# Patient Record
(demographics unavailable — no encounter records)

---

## 2025-02-12 NOTE — ASSESSMENT
[FreeTextEntry1] : # Type 2 diabetes mellitus / Obesity class 1 - A1C is 6.5% today. Denies hypoglycemic episodes but reports tightly controlled fasting glucose levels. Additionally, now with vaginitis, unclear if related to SGLT-2 inhibitor use. Received antifungal treatment without much improvement. Following with gynecologist.  - STOP Jardiance 25 mg daily due to vaginitis. Discussed rationale for discontinuing the medication, given the potential for increased urinary glucose and predisposition to genitourinary infections. - CONTINUE Metformin 1,000 mg twice daily. - DECREASE Glipizide to 5 mg IR twice daily due to reported tightly controlled glucose while fasting. - START Mounjaro at 2.5 mg weekly for the first four doses, then INCREASE to 5 mg weekly, if no side effects, to maintain glycemic control and aid with weight loss. We discussed the risks, benefits, and contraindications of starting this medication, including nausea, vomiting, diarrhea, constipation, pancreatitis, and gallstones. She denied having a personal history of pancreatitis or a family history of medullary thyroid cancer. - Goal is to eventually discontinue glipizide if able to up-titrate Mounjaro to an effective maintenance dose. - Reviewed importance of balanced diet and portion control to optimize efficacy of GLP-1 agonist therapy. - Prescription sent to preferred pharmacy. Advised to notify if any issues with obtaining medication or tolerance. - Microvascular complications:     > Neuropathy: Monofilament was within normal limits today (7/2024).     > Nephropathy: She had an albumin/creatinine ratio at an outside lab. She mentioned that she would have the results faxed to me later today.      > Retinopathy: Last dilated eye exam a year ago. She was following with an ophthalmologist for known retinopathy. Encouraged her to schedule another appointment. She said that she would schedule the appointment.    # Hyperlipidemia - Continue with atorvastatin 20 mg nightly.  - Diet and lifestyle modification advised.  RTC in 3 months

## 2025-02-12 NOTE — PHYSICAL EXAM
[Alert] : alert [Well Nourished] : well nourished [Obese] : obese [No Acute Distress] : no acute distress [Well Developed] : well developed [Normal Sclera/Conjunctiva] : normal sclera/conjunctiva [EOMI] : extra ocular movement intact [No Proptosis] : no proptosis [Thyroid Not Enlarged] : the thyroid was not enlarged [No Thyroid Nodules] : no palpable thyroid nodules [No Respiratory Distress] : no respiratory distress [No Accessory Muscle Use] : no accessory muscle use [Clear to Auscultation] : lungs were clear to auscultation bilaterally [Normal S1, S2] : normal S1 and S2 [Normal Rate] : heart rate was normal [Regular Rhythm] : with a regular rhythm [No Edema] : no peripheral edema [Pedal Pulses Normal] : the pedal pulses are present [Normal Bowel Sounds] : normal bowel sounds [Not Tender] : non-tender [Not Distended] : not distended [Soft] : abdomen soft [Normal Gait] : normal gait [Normal Strength/Tone] : muscle strength and tone were normal [No Rash] : no rash [Normal Reflexes] : deep tendon reflexes were 2+ and symmetric [No Tremors] : no tremors [Oriented x3] : oriented to person, place, and time

## 2025-02-12 NOTE — HISTORY OF PRESENT ILLNESS
[FreeTextEntry1] : Ms. De Anda is a 60-year-old woman with type 2 diabetes mellitus, obesity class 3, hypertension, hyperlipidemia, endometriosis, endometrial polyps, and fibroids who presents for follow up.   DIABETES HISTORY - Age at diagnosis: 35 years old. - Symptoms at the time of diagnosis: Found on routine labs.  - Current Therapy: Metformin 1,000 mg twice daily, Jardiance 25 mg daily, and Glipizide 10 mg IR twice daily.  - History of other regimens: Denies. - History of hypoglycemia: Denies. - History of DKA/HHS: Denies. - Complications: Denies neuropathy symptoms. She has known retinopathy and reports having multiple injections and laser therapy in the past. Following with ophthalmologist, has scheduled appointment later this month.   2/12/25 - Ms. De Anda presents for a follow-up visit, reporting a recent emergency room visit due to urinary discomfort and a burning sensation in her bladder. The workup revealed her urinalysis was within normal limits (not available for review), except for glucosuria, which is expected given her current use of an SGLT-2 inhibitor. However, a vaginal exam showed hyperemia. She was initially prescribed medication in the emergency room for suspected vaginitis and subsequently followed up with her gynecologist, who prescribed another course of treatment. She reports only minimal improvement in her symptoms.  Additionally, she is experiencing issues with stress urinary incontinence and has been recently referred to a urologist for further evaluation. Ms. De Anda notes that her fasting glucose levels have been on the lower side, sometimes reaching 68 to the low 70s mg/dL, but she does not check fingersticks at other times of the day. She denies experiencing any hypoglycemic episodes. Her weight has remained stable over the past year, with her current BMI at 49.3.

## 2025-05-14 NOTE — ASSESSMENT
[FreeTextEntry1] : # Type 2 diabetes mellitus / Obesity class 3 - Diabetes is well-controlled with current regimen of Mounjaro and once-daily metformin. HbA1c has improved to 6.0%. Has lost ~20 lbs since starting the medication.  - CONTINUE Metformin 1,000 mg daily and Mounjaro 5 mg weekly.  - Microvascular complications:     > Neuropathy: Monofilament was within normal limits today (7/2024).     > Nephropathy: Elevated urine ACR (274 mg/g on 2/2025). Already on ARB. Unable to take SGLT-2 due to episode of vaginitis.      > Retinopathy: Following with an ophthalmologist for known retinopathy.    # Hyperlipidemia - LDL 83 mg/dL. - Continue with atorvastatin 20 mg nightly.  - Diet and lifestyle modification advised.  RTC in 3 months

## 2025-05-14 NOTE — REVIEW OF SYSTEMS
[Fatigue] : no fatigue [Recent Weight Loss (___ Lbs)] : recent weight loss: [unfilled] lbs [Fever] : no fever [Chest Pain] : no chest pain [Nausea] : no nausea [Constipation] : no constipation [Vomiting] : no vomiting [Diarrhea] : no diarrhea

## 2025-05-14 NOTE — HISTORY OF PRESENT ILLNESS
[FreeTextEntry1] : Ms. De Anda is a 60-year-old woman with type 2 diabetes mellitus, obesity class 3, hypertension, hyperlipidemia, endometriosis, endometrial polyps, and fibroids who presents for follow up.   DIABETES HISTORY - Age at diagnosis: 35 years old. - Symptoms at the time of diagnosis: Found on routine labs.  - Current Therapy: Metformin 1,000 mg twice daily and Mounjaro 5 mg weekly.  - History of other regimens: Jardiance 25 mg daily (Vaginitis) and Glipizide (Hypoglycemia).  - History of hypoglycemia: Denies. - History of DKA/HHS: Denies. - Complications: Denies neuropathy symptoms. She has known retinopathy and reports having multiple injections and laser therapy in the past. Following with ophthalmologist, has scheduled appointment later this month.   2/12/25 - Ms. De Anda presents for a follow-up visit, reporting a recent emergency room visit due to urinary discomfort and a burning sensation in her bladder. The workup revealed her urinalysis was within normal limits (not available for review), except for glucosuria, which is expected given her current use of an SGLT-2 inhibitor. However, a vaginal exam showed hyperemia. She was initially prescribed medication in the emergency room for suspected vaginitis and subsequently followed up with her gynecologist, who prescribed another course of treatment. She reports only minimal improvement in her symptoms. Additionally, she is experiencing issues with stress urinary incontinence and has been recently referred to a urologist for further evaluation. Ms. De Anda notes that her fasting glucose levels have been on the lower side, sometimes reaching 68 to the low 70s mg/dL, but she does not check fingersticks at other times of the day. She denies experiencing any hypoglycemic episodes. Her weight has remained stable over the past year, with her current BMI at 49.3.   5/14/25 - Ms. De Anda presented for follow-up of her diabetes management. She reports significant weight loss of approximately 20 pounds since starting Mounjaro in February. Her weight has decreased from 270 lb (BMI 47) to approximately 250-252 lbs (based on her home scale). She is currently using Mounjaro, having completed one box of the 2.5 mg dose and now on her second box of the 5 mg dose. She denies experiencing any side effects from the medication. Ms. De Anda reports that a previous vaginal infection has resolved. Regarding her diabetes medications, Ms. De Anda reports she self-discontinued glipizide due to experiencing dizziness and suspected hypoglycemic episodes. She continues taking metformin but has reduced it to once daily with her evening meal, which she describes as her heaviest meal of the day. Her fasting blood glucose levels have been well-controlled, ranging between 100-120 mg/dL (specifically mentioning readings of 100, 111, and 116 mg/dL). Ms. De Anda reports feeling significantly different and better overall, stating she feels "like a different person" with the current treatment regimen. She specifically mentioned being able to get down on the floor to  items and get back up easily, which she was unable to do before the weight loss.